# Patient Record
Sex: MALE | Race: OTHER
[De-identification: names, ages, dates, MRNs, and addresses within clinical notes are randomized per-mention and may not be internally consistent; named-entity substitution may affect disease eponyms.]

---

## 2018-03-09 ENCOUNTER — HOSPITAL ENCOUNTER (EMERGENCY)
Dept: HOSPITAL 88 - ER | Age: 48
Discharge: HOME | End: 2018-03-09
Payer: COMMERCIAL

## 2018-03-09 VITALS — BODY MASS INDEX: 27.62 KG/M2 | WEIGHT: 176 LBS | HEIGHT: 67 IN

## 2018-03-09 DIAGNOSIS — I10: ICD-10-CM

## 2018-03-09 DIAGNOSIS — R53.1: Primary | ICD-10-CM

## 2018-03-09 DIAGNOSIS — G51.0: ICD-10-CM

## 2018-03-09 PROCEDURE — 99283 EMERGENCY DEPT VISIT LOW MDM: CPT

## 2018-03-09 PROCEDURE — 70450 CT HEAD/BRAIN W/O DYE: CPT

## 2018-03-09 NOTE — DIAGNOSTIC IMAGING REPORT
EXAMINATION: Head CT without contrast.  





HISTORY:Left facial droop for 3 days.



COMPARISON:None.

TECHNIQUE: Multidetector axial images were obtained from the foramen magnum to

the vertex without contrast. The images were reconstructed using brain and bone

algorithms.  Thin section brain images were reformatted into coronal and

sagittal planes.  

Intravenous contrast: None  



IMAGE QUALITY: Acceptable.



FINDINGS:

    Skull/scalp: No lytic or blastic. lesions.  No surgical changes.

    Parenchyma: No abnormal density.

No acute hemorrhage, mass or acute major vascular territorial infarct.

    Arteries: No density suggestive of thrombosis.   

    Dural sinuses: No abnormal density suggestive of thrombosis. 

    Ventricles: Mild compensated dilatation due to volume loss. No

hydrocephalus.

    Extra-axial spaces: No abnormal density.  

    Brain volume: Mild generalized cerebral volume loss, advanced for patient's

given age.

    Craniocervical junction: No mass, Chiari malformation, or basilar

invagination.

    Sella: No mass. 

    Paranasal/mastoid sinuses: Mild mucosal thickening in right posterior

ethmoid sinus.



IMPRESSION:

No acute intracranial abnormality.



Signed by: Dr. Concepcion Verdin M.D. on 3/9/2018 1:36 AM